# Patient Record
Sex: FEMALE | Race: WHITE | NOT HISPANIC OR LATINO | Employment: FULL TIME | URBAN - METROPOLITAN AREA
[De-identification: names, ages, dates, MRNs, and addresses within clinical notes are randomized per-mention and may not be internally consistent; named-entity substitution may affect disease eponyms.]

---

## 2024-02-06 ENCOUNTER — OFFICE VISIT (OUTPATIENT)
Dept: FAMILY MEDICINE | Facility: CLINIC | Age: 59
End: 2024-02-06
Payer: COMMERCIAL

## 2024-02-06 VITALS
OXYGEN SATURATION: 94 % | DIASTOLIC BLOOD PRESSURE: 85 MMHG | BODY MASS INDEX: 50.78 KG/M2 | RESPIRATION RATE: 16 BRPM | HEART RATE: 98 BPM | TEMPERATURE: 97.9 F | WEIGHT: 286.6 LBS | HEIGHT: 63 IN | SYSTOLIC BLOOD PRESSURE: 128 MMHG

## 2024-02-06 DIAGNOSIS — M54.42 CHRONIC BILATERAL LOW BACK PAIN WITH LEFT-SIDED SCIATICA: Primary | ICD-10-CM

## 2024-02-06 DIAGNOSIS — G89.29 CHRONIC BILATERAL LOW BACK PAIN WITH LEFT-SIDED SCIATICA: Primary | ICD-10-CM

## 2024-02-06 PROCEDURE — 99203 OFFICE O/P NEW LOW 30 MIN: CPT | Mod: GC

## 2024-02-06 RX ORDER — AZELASTINE HYDROCHLORIDE, FLUTICASONE PROPIONATE 137; 50 UG/1; UG/1
SPRAY, METERED NASAL 2 TIMES DAILY
COMMUNITY

## 2024-02-06 RX ORDER — CETIRIZINE HYDROCHLORIDE 10 MG/1
10 TABLET ORAL DAILY
COMMUNITY

## 2024-02-06 RX ORDER — PANTOPRAZOLE SODIUM 40 MG/1
1 TABLET, DELAYED RELEASE ORAL DAILY
COMMUNITY
Start: 2023-06-27

## 2024-02-06 RX ORDER — ALBUTEROL SULFATE 90 UG/1
2 AEROSOL, METERED RESPIRATORY (INHALATION) EVERY 6 HOURS PRN
COMMUNITY

## 2024-02-06 RX ORDER — TRIAMCINOLONE ACETONIDE 55 UG/1
SPRAY, METERED NASAL
COMMUNITY

## 2024-02-06 RX ORDER — CALCIPOTRIENE 50 UG/G
OINTMENT TOPICAL
COMMUNITY

## 2024-02-06 RX ORDER — EPINEPHRINE 0.3 MG/.3ML
INJECTION SUBCUTANEOUS
COMMUNITY
Start: 2022-08-22

## 2024-02-06 RX ORDER — BETAMETHASONE DIPROPIONATE 0.5 MG/ML
LOTION, AUGMENTED TOPICAL
COMMUNITY
Start: 2023-11-16

## 2024-02-06 NOTE — PROGRESS NOTES
Assessment & Plan   Chronic bilateral low back pain  6 months of worsening lower back pain and left leg pain worse with walking/stairs.  No red flags on history or exam to warrant emergent imaging.  Given the length of her back/leg pain and that it is worsening, MRI imaging is warranted to evaluate for the cause of her pain.differential diagnosis includes osteoarthritis/degenerative disk disease, disk herniation, lumbar spine canal stenosis, periphral artery disease/claudation.   - MR Lumbar Spine w/o & w Contrast  - Instructed patient on using tylenol for pain and ibuprofen for break through pain  - PT referral placed    Patient Instructions   Thank you for trusting us with your care.     Here's a summary of the visit today:   MRI ordered today   Follow up in 2 weeks            Thank you.     Dr. Wilton Harp, MS3 [facility] [employer]    I was present with the medical student who participated in the service and in the documentation of this note. I have verified the history and personally performed the physical exam and medical decision making, and have verified the content of the note, which accurately reflects my assessment of the patient and the plan of care.     Shaniqua Núñez,  PGY3  [facility]  February 6, 2024    I precepted today with Dr. Funk.      Return in about 2 weeks (around 2/20/2024).      Messi Miles is a 58 year old, presenting for the following health issues:  Other (Lower back pain with tingly on legs on and off for the past 5 month. Last two weeks have been getting worse /)    HPI   Low Back Pain/Injury   6 months ago gradually developed left lower back pain/pressure and left leg pain worse with walking and stairs.  No specific inciting injury or event preceded the onset of her pain.  She was seen at an outside clinic in November of 2023 and given a PT referral, attending 6 sessions and performing these exercises at home without improvement.  Her pain  "generally improved with rest and taking ibuprofen, but notes having to take 800mg BID to control her pain.  Last two weeks her pain has worsened, especially in the left leg, and so she presented here.  She has not had any saddle anesthesia, loss of control of bowel or bladder, numbness, weakness, previous back/hip/leg surgeries.  No pain worse at night, weight loss, night sweats.  No prior history of back pain or problems.  Normally able to walk a few miles but now struggling to get through a store.    Pitfall evaluation:  No fever  No hematuria  No inner thigh numbness  No foot drop  No urine retention or incontinence  No stool retention or incontinence  No spine surgery history  No compression fracture or osteoporosis history  No nephrolithiasis history    Review of Systems  Constitutional, HEENT, cardiovascular, pulmonary, gi and gu systems are negative, except as otherwise noted.      Objective    /85   Pulse 98   Temp 97.9  F (36.6  C) (Oral)   Resp 16   Ht 1.607 m (5' 3.27\")   Wt 130 kg (286 lb 9.6 oz)   LMP 08/15/2009   SpO2 94%   BMI 50.34 kg/m    Body mass index is 50.34 kg/m .  Physical Exam   GENERAL: alert, no distress  NECK: no asymmetry, masses, or scars  RESP: lungs clear to auscultation - no rales, rhonchi or wheezes  CV: regular rate and rhythm, normal S1 S2, no S3 or S4, no murmur, click or rub, no peripheral edema  ABDOMEN: soft, nontender, no hepatosplenomegaly, no masses and bowel sounds normal  MS: No midline spinal tenderness.  Left lower paraspinal tenderness.  Negative straight leg raise bilaterally.  Full ROM of bilateral hips without pain.  No tenderness overlying hip joints.  Neuro: 5/5 strength at hips, knees, and ankles bilaterally.  2+ patellar and achilles reflexes bilaterally.  Intact sensation to light touch.  Able to walk on toes and heels without issue, negative SLR, negative patricks    No results found for any visits on 02/06/24.        "

## 2024-02-06 NOTE — PATIENT INSTRUCTIONS
Thank you for trusting us with your care.     Here's a summary of the visit today:   MRI ordered today   Follow up in 2 weeks            Thank you.     Dr. Núñez

## 2024-02-06 NOTE — PROGRESS NOTES
Preceptor Attestation:    I discussed the patient with the resident and evaluated the patient in person. I have verified the content of the note, which accurately reflects my assessment of the patient and the plan of care.   Supervising Physician:  Albaro Funk MD.

## 2024-02-16 ENCOUNTER — HOSPITAL ENCOUNTER (OUTPATIENT)
Dept: MRI IMAGING | Facility: HOSPITAL | Age: 59
Discharge: HOME | End: 2024-02-16
Attending: FAMILY MEDICINE | Admitting: FAMILY MEDICINE
Payer: COMMERCIAL

## 2024-02-16 ENCOUNTER — TELEPHONE (OUTPATIENT)
Dept: FAMILY MEDICINE | Facility: CLINIC | Age: 59
End: 2024-02-16

## 2024-02-16 DIAGNOSIS — G89.29 CHRONIC BILATERAL LOW BACK PAIN WITH LEFT-SIDED SCIATICA: Primary | ICD-10-CM

## 2024-02-16 DIAGNOSIS — M54.42 CHRONIC BILATERAL LOW BACK PAIN WITH LEFT-SIDED SCIATICA: Primary | ICD-10-CM

## 2024-02-16 PROCEDURE — 72158 MRI LUMBAR SPINE W/O & W/DYE: CPT

## 2024-02-16 PROCEDURE — 255N000002 HC RX 255 OP 636: Performed by: FAMILY MEDICINE

## 2024-02-16 PROCEDURE — A9585 GADOBUTROL INJECTION: HCPCS | Performed by: FAMILY MEDICINE

## 2024-02-16 RX ORDER — GADOBUTROL 604.72 MG/ML
0.1 INJECTION INTRAVENOUS ONCE
Status: COMPLETED | OUTPATIENT
Start: 2024-02-16 | End: 2024-02-16

## 2024-02-16 RX ADMIN — GADOBUTROL 13 ML: 604.72 INJECTION INTRAVENOUS at 07:38

## 2024-02-16 NOTE — TELEPHONE ENCOUNTER
RECORDS RECEIVED FROM: Internal   REFERRED BY: Albaro Funk MD   REASON FOR VISIT: Chronic bilateral low back pain with left-sided sciatica   Date of Appt: 2/21/2024   NOTES (FOR ALL VISITS) STATUS DETAILS   OFFICE NOTE from referring provider Internal    OFFICE NOTE from other specialist Internal 2/6/2024   DISCHARGE SUMMARY from hospital N/A    DISCHARGE REPORT from the ER N/A    SURGERY N/A    PHYSICAL THERAPY Care Everywhere 2 sessions 9, 10/2023   INJECTIONS N/A    EMG N/A    IMAGING  (FOR ALL VISITS)     XR (HEAD, NECK, SPINE) N/A    MRI (HEAD, NECK, SPINE) Internal Lumbar 2/16/2024   CT (HEAD, NECK, SPINE) N/A

## 2024-02-16 NOTE — TELEPHONE ENCOUNTER
[facility] phone call message- patient requesting results:    Test: Lab and MRI    Date of test: 2/16    Additional Comments: returning a call from this morning about results    OK to leave a message on voice mail? Yes        Primary language: English      needed? No    Call taken on February 16, 2024 at 10:06 AM by Gerson Mojica

## 2024-02-21 ENCOUNTER — OFFICE VISIT (OUTPATIENT)
Dept: NEUROSURGERY | Facility: CLINIC | Age: 59
End: 2024-02-21
Attending: FAMILY MEDICINE
Payer: COMMERCIAL

## 2024-02-21 ENCOUNTER — PRE VISIT (OUTPATIENT)
Dept: NEUROSURGERY | Facility: CLINIC | Age: 59
End: 2024-02-21

## 2024-02-21 VITALS
WEIGHT: 282 LBS | DIASTOLIC BLOOD PRESSURE: 69 MMHG | OXYGEN SATURATION: 94 % | SYSTOLIC BLOOD PRESSURE: 126 MMHG | BODY MASS INDEX: 49.53 KG/M2 | HEART RATE: 74 BPM

## 2024-02-21 DIAGNOSIS — M54.42 CHRONIC BILATERAL LOW BACK PAIN WITH LEFT-SIDED SCIATICA: ICD-10-CM

## 2024-02-21 DIAGNOSIS — G89.29 CHRONIC BILATERAL LOW BACK PAIN WITH LEFT-SIDED SCIATICA: ICD-10-CM

## 2024-02-21 PROCEDURE — 99204 OFFICE O/P NEW MOD 45 MIN: CPT | Performed by: SURGERY

## 2024-02-21 RX ORDER — ACETAMINOPHEN 325 MG/1
325-650 TABLET ORAL EVERY 6 HOURS PRN
COMMUNITY

## 2024-02-21 ASSESSMENT — PAIN SCALES - GENERAL: PAINLEVEL: EXTREME PAIN (8)

## 2024-02-21 NOTE — NURSING NOTE
"Ginger Carreon is a 58 year old female who presents for:  Chief Complaint   Patient presents with    Consult     Bilateral low-back pain   \"L leg numb\"        Initial Vitals:  /69 (BP Location: Left arm, Patient Position: Sitting, Cuff Size: Adult Regular)   Pulse 74   Wt 282 lb (127.9 kg)   LMP 08/15/2009   SpO2 94%   BMI 49.53 kg/m   Estimated body mass index is 49.53 kg/m  as calculated from the following:    Height as of 2/6/24: 5' 3.27\" (1.607 m).    Weight as of this encounter: 282 lb (127.9 kg).. Body surface area is 2.39 meters squared. BP completed using cuff size: regular  Extreme Pain (8)    Leland Haque    "

## 2024-02-21 NOTE — LETTER
2/21/2024         RE: Ginger Carreon  [address]  [place] [address]        Dear Colleague,    Thank you for referring your patient, Ginger Carreon, to the [facility]. Please see a copy of my visit note below.    The patient is a 58-year-old female.  She complains of 1 year of pain and weakness in the left leg.  She has a feeling of pressure in her left but.  She does not have radiating symptoms in the right leg but she does have focal right knee pain.  On past medical history she has a heart murmur and asthma.  She does not have cancer or diabetes.  Unfortunately she is super morbidly obese with a BMI over 50.  I showed her her MRI pictures and a model spine.  She has a large synovial cyst coming out of the facet joint on the left at the L4-5 level.  I told her what is involved in surgical removal and also gave her the option of trying a needle aspiration.  At this point she wants to lose a significant amount of weight to make herself a better surgical candidate.  I did do some counseling on weight loss going over what I consider to be the top 3 programs.  She is satisfied with the plan.  Total time 20 minutes, more than 50% spent counseling and/or coordinating care.      Again, thank you for allowing me to participate in the care of your patient.        Sincerely,        Ganesh Irwin MD